# Patient Record
(demographics unavailable — no encounter records)

---

## 2024-10-22 NOTE — PLAN
[None] : None [FreeTextEntry1] : Pt to call with heavy bleeding or pain not controlled with NSAIDs Pathology and Surgical photos reviewed today Pt to f/u in 4 weeks for final post op visit. Supportive care measure discussed. All questions answered, pt agreeable with plan.   I Siena Villegas [facility] am scribing for the presence of Dr. Merrill the following sections HISTORY OF PRESENT ILLNESS, PAST MEDICAL/FAMILY/SOCIAL HISTORY; REVIEW OF SYSTEMS; VITAL SIGNS; PHYSICAL EXAM; DISPOSITION. I personally performed the services described in the documentation, reviewed the documentation recorded by the scribe in my presence and it accurately and completely records my words and actions.

## 2024-10-22 NOTE — HISTORY OF PRESENT ILLNESS
[Pain is well-controlled] : pain is well-controlled [Clean/Dry/Intact] : clean, dry and intact [None] : no vaginal bleeding [Normal] : normal [Pathology reviewed] : pathology reviewed [Fever] : no fever [Chills] : no chills [Nausea] : no nausea [Vomiting] : no vomiting [Erythema] : not erythematous [de-identified] : Patient is a 49 yo female here today post op s/p LESS LSH BS 10/3/24

## 2024-10-22 NOTE — HISTORY OF PRESENT ILLNESS
[Pain is well-controlled] : pain is well-controlled [Clean/Dry/Intact] : clean, dry and intact [None] : no vaginal bleeding [Normal] : normal [Pathology reviewed] : pathology reviewed [Fever] : no fever [Chills] : no chills [Nausea] : no nausea [Vomiting] : no vomiting [Erythema] : not erythematous [de-identified] : Patient is a 49 yo female here today post op s/p LESS LSH BS 10/3/24

## 2024-10-22 NOTE — PLAN
[None] : None [FreeTextEntry1] : Pt to call with heavy bleeding or pain not controlled with NSAIDs Pathology and Surgical photos reviewed today Pt to f/u in 4 weeks for final post op visit. Supportive care measure discussed. All questions answered, pt agreeable with plan.   I Siena Villegas [facility] am scribing for the presence of Dr. Merrill the following sections HISTORY OF PRESENT ILLNESS, PAST MEDICAL/FAMILY/SOCIAL HISTORY; REVIEW OF SYSTEMS; VITAL SIGNS; PHYSICAL EXAM; DISPOSITION. I personally performed the services described in the documentation, reviewed the documentation recorded by the scribe in my presence and it accurately and completely records my words and actions.

## 2024-11-19 NOTE — PLAN
[None] : None [FreeTextEntry1] : no restrictions at this time she is to call if she has an increase in pain.  Pt to call with heavy bleeding or pain not controlled with NSAIDs Supportive care measure discussed.  dictated note to Dr. Zacarias. she is to follow up with him for her routine GYN care.  All questions answered, pt agreeable with plan.   I Martha KHANNA am scribing for the presence of Dr. Merrill the following sections HISTORY OF PRESENT ILLNESS, PAST MEDICAL/FAMILY/SOCIAL HISTORY; REVIEW OF SYSTEMS; VITAL SIGNS; PHYSICAL EXAM; DISPOSITION.    I personally performed the services described in the documentation, reviewed the documentation recorded by the scribe in my presence and it accurately and completely records my words and actions.

## 2024-11-19 NOTE — HISTORY OF PRESENT ILLNESS
[de-identified] : s/p LESS LS BS on 10/3/2024. she had a post op infection was initially treated with Keflex which did not improve symptoms. she started Bactrim on 11/11/2024, symptoms have improved. she completed the full course.

## 2025-07-22 NOTE — PHYSICAL EXAM
[Labia Majora] : normal [Labia Minora] : normal [Normal] : normal [Uterine Adnexae] : normal [Absent] : absent [FreeTextEntry2] : Allyson FNP-BC

## 2025-07-22 NOTE — PLAN
[FreeTextEntry1] : bleeding pattern likely related to perimenopause state referrals to therapy provided follow up with  for routine care AQA pt verbalized understanding   I Siena Villegas [facility] am scribing for the presence of Dr. Merrill the following sections HISTORY OF PRESENT ILLNESS, PAST MEDICAL/FAMILY/SOCIAL HISTORY; REVIEW OF SYSTEMS; VITAL SIGNS; PHYSICAL EXAM; DISPOSITION. I personally performed the services described in the documentation, reviewed the documentation recorded by the scribe in my presence and it accurately and completely records my words and actions.

## 2025-07-22 NOTE — HISTORY OF PRESENT ILLNESS
[FreeTextEntry1] : Patient is a 50 yo female here today with c/o light bleeding cyclically, weight gain and emotional lability. Pt recently broke foot.   hx of LESS LS BS 10/2024

## 2025-07-22 NOTE — HISTORY OF PRESENT ILLNESS
[FreeTextEntry1] : Patient is a 48 yo female here today with c/o light bleeding cyclically, weight gain and emotional lability. Pt recently broke foot.   hx of LESS LS BS 10/2024

## 2025-07-22 NOTE — PLAN
[FreeTextEntry1] : bleeding pattern likely related to perimenopause state referrals to therapy provided follow up with  for routine care AQA pt verbalized understanding   I Siena Villegas [facility] am scribing for the presence of Dr. Merrill the following sections HISTORY OF PRESENT ILLNESS, PAST MEDICAL/FAMILY/SOCIAL HISTORY; REVIEW OF SYSTEMS; VITAL SIGNS; PHYSICAL EXAM; DISPOSITION. I personally performed the services described in the documentation, reviewed the documentation recorded by the scribe in my presence and it accurately and completely records my words and actions.

## 2025-07-22 NOTE — PLAN
[FreeTextEntry1] : bleeding pattern likely related to perimenopause state referrals to therapy provided follow up with  for routine care AQA pt verbalized understanding   I Siena Villegas [facility] am scribing for the presence of Dr. Merrill the following sections HISTORY OF PRESENT ILLNESS, PAST MEDICAL/FAMILY/SOCIAL HISTORY; REVIEW OF SYSTEMS; VITAL SIGNS; PHYSICAL EXAM; DISPOSITION. I personally performed the services described in the documentation, reviewed the documentation recorded by the scribe in my presence and it accurately and completely records my words and actions.